# Patient Record
Sex: MALE | Race: ASIAN
[De-identification: names, ages, dates, MRNs, and addresses within clinical notes are randomized per-mention and may not be internally consistent; named-entity substitution may affect disease eponyms.]

---

## 2020-05-29 ENCOUNTER — HOSPITAL ENCOUNTER (EMERGENCY)
Dept: HOSPITAL 47 - EC | Age: 75
Discharge: HOME | End: 2020-05-29
Payer: MEDICARE

## 2020-05-29 VITALS — RESPIRATION RATE: 18 BRPM

## 2020-05-29 VITALS — HEART RATE: 80 BPM | TEMPERATURE: 98.5 F | SYSTOLIC BLOOD PRESSURE: 131 MMHG | DIASTOLIC BLOOD PRESSURE: 78 MMHG

## 2020-05-29 DIAGNOSIS — Z85.118: ICD-10-CM

## 2020-05-29 DIAGNOSIS — Z85.841: ICD-10-CM

## 2020-05-29 DIAGNOSIS — H57.12: Primary | ICD-10-CM

## 2020-05-29 PROCEDURE — 99283 EMERGENCY DEPT VISIT LOW MDM: CPT

## 2020-05-29 NOTE — ED
Eye Problem HPI





- General


Chief complaint: Eye Problems


Stated complaint: L eye pain


Time Seen by Provider: 05/29/20 15:14


Source: patient, family


Mode of arrival: ambulatory


Limitations: language barrier





- History of Present Illness


Initial comments: 





Patient is a 74-year-old male presenting to emergency Department with complaints

of left eye pain 2 weeks.  Patient speaks very little English, patient's son is

translating.  He denies any injuries or trauma to his left eye.  Patient does 

have history of corneal bilateral eye surgery approximately 7 or 8 years ago.  

He denies any blurry vision, double vision, black spots, vision changes, 

discharge.  He denies a headache, ear pain, fever, chills.  He states the pain 

slightly increases in the evening hours.  He has not tried Tylenol or Motrin.  

He has not followed up with his eye doctor.  He has no further complaints at 

this time.





- Related Data


                                    Allergies











Allergy/AdvReac Type Severity Reaction Status Date / Time


 


No Known Allergies Allergy   Verified 05/29/20 14:46














Review of Systems


ROS Statement: 


Those systems with pertinent positive or pertinent negative responses have been 

documented in the HPI.





ROS Other: All systems not noted in ROS Statement are negative.





Past Medical History


Past Medical History: Diabetes Mellitus, Hypertension


Additional Past Medical History / Comment(s): Lung cancer, brain cancer- c

urrently getting tx


History of Any Multi-Drug Resistant Organisms: None Reported


Past Surgical History: No Surgical Hx Reported


Past Psychological History: No Psychological Hx Reported


Smoking Status: Never smoker


Past Alcohol Use History: None Reported


Past Drug Use History: None Reported





General Exam





- General Exam Comments


Initial Comments: 





GENERAL: 


Well-appearing, well-nourished and in no acute distress.





HEAD: 


Atraumatic, normocephalic.





EYES:


Pupils equal round and reactive to light, extraocular movements intact, sclera 

anicteric, conjunctiva are normal.  There is no discharge.  Bilateral eye 

pressure is 18.  Visual acuity is normal.





ENT: 


TMs normal, nares patent, oropharynx clear without exudates.  Moist mucous 

membranes.





NECK: 


Normal range of motion, supple without lymphadenopathy or JVD.





LUNGS:


 Breath sounds clear to auscultation bilaterally and equal.  No wheezes rales or

rhonchi.





HEART:


Regular rate and rhythm without murmurs, rubs or gallops.





ABDOMEN: 


Soft, nontender, normoactive bowel sounds.  No guarding, no rebound.  No masses 

appreciated.





: Deferred 





EXTREMITIES: 


Normal range of motion, no pitting or edema.  No clubbing or cyanosis.





NEUROLOGICAL: 


Cranial nerves II through XII grossly intact.  Normal speech, normal gait.





PSYCH:


Normal mood, normal affect.





SKIN:


 Warm, Dry, normal turgor, no rashes or lesions noted.


Limitations: language barrier





Course


                                   Vital Signs











  05/29/20 05/29/20





  14:43 17:01


 


Temperature 98.2 F 98.5 F


 


Pulse Rate 81 80


 


Respiratory 18 18





Rate  


 


Blood Pressure 150/77 131/78


 


O2 Sat by Pulse 100 97





Oximetry  














Medical Decision Making





- Medical Decision Making





Patient is a 74-year-old male here for left eye pain 2 weeks.  Patient's son is

translating.  There is no blurry vision, double vision, black spots, discharge, 

no recent fever or chills.  Patient's exam today is completely unremarkable.  

Eye pressure is normal.  Visual acuity is normal.  He has not tried Tylenol or 

Motrin for this or have followed up with his eye doctor.  Patient is stable for 

discharge.  I recommended continuing with artificial tears and follow up with 

ophthalmology in Monday morning.  They are in agreement with this plan of care. 

Return parameters were discussed with the patient and patient's son and they 

both verbalized understanding.  





Disposition


Clinical Impression: 


 Left eye pain





Disposition: HOME SELF-CARE


Condition: Stable


Instructions (If sedation given, give patient instructions):  Eye Pain (ED)


Additional Instructions: 


Please return to the Emergency Department if symptoms worsen or any other 

concerns.


Follow-up with ophthalmology as discussed.


Continue with artificial tears.


Is patient prescribed a controlled substance at d/c from ED?: No


Referrals: 


None,Stated [Primary Care Provider] - 1-2 days


Douglas Norris MD [STAFF PHYSICIAN] - 1-2 days